# Patient Record
Sex: FEMALE | Race: WHITE | URBAN - METROPOLITAN AREA
[De-identification: names, ages, dates, MRNs, and addresses within clinical notes are randomized per-mention and may not be internally consistent; named-entity substitution may affect disease eponyms.]

---

## 2018-05-05 ENCOUNTER — OFFICE VISIT (OUTPATIENT)
Dept: URGENT CARE | Facility: URGENT CARE | Age: 68
End: 2018-05-05
Payer: COMMERCIAL

## 2018-05-05 VITALS
SYSTOLIC BLOOD PRESSURE: 122 MMHG | DIASTOLIC BLOOD PRESSURE: 79 MMHG | HEART RATE: 80 BPM | RESPIRATION RATE: 20 BRPM | TEMPERATURE: 98.9 F | WEIGHT: 184.5 LBS

## 2018-05-05 DIAGNOSIS — S91.209A TOENAIL AVULSION, INITIAL ENCOUNTER: Primary | ICD-10-CM

## 2018-05-05 PROCEDURE — 99203 OFFICE O/P NEW LOW 30 MIN: CPT | Mod: 25 | Performed by: PHYSICIAN ASSISTANT

## 2018-05-05 PROCEDURE — 90471 IMMUNIZATION ADMIN: CPT | Performed by: PHYSICIAN ASSISTANT

## 2018-05-05 PROCEDURE — 90714 TD VACC NO PRESV 7 YRS+ IM: CPT | Performed by: PHYSICIAN ASSISTANT

## 2018-05-05 NOTE — PROGRESS NOTES
SUBJECTIVE:  Chief Complaint   Patient presents with     Toe Injury     Lt big toe injury x today about 4:30 pm.      Lawanda Langley is a 68 year old female presents with a chief complaint of left toe(s) great pain and tenderness.  The injury occurred a few hours ago.   The injury happened while at home and moving heavy concrete into a wheel barrel which fell onto left great toe. How: trauma:   The patient complained of mild pain  and has not had decreased ROM.  Pain exacerbated by walking.  Relieved by nothing.  She treated it initially with no therapy. This is the first time this type of injury has occurred to this patient.   She noticed the nail was gone and had some bleeding. Tetanus 2009.     No past medical history on file.  Current Outpatient Prescriptions   Medication Sig Dispense Refill     LEVOTHYROXINE SODIUM PO        SIMVASTATIN PO        Social History   Substance Use Topics     Smoking status: Never Smoker     Smokeless tobacco: Never Used     Alcohol use Not on file       ROS:  CONSTITUTIONAL:NEGATIVE for fever, chills, change in weight  INTEGUMENTARY/SKIN: nail problem to left great toe  MUSCULOSKELETAL: arthralgias left great toe    EXAM:   /79  Pulse 80  Temp 98.9  F (37.2  C) (Oral)  Resp 20  Wt 184 lb 8 oz (83.7 kg)  Gen: healthy,alert,no distress  Extremity: toe(s) great left has mild point tenderness nail bed and FROM. Nailbed is jaggedly gouged with minimal bleeding.   There is not compromise to the distal circulation.  Pulses are +2 and CRT is brisk  GENERAL APPEARANCE: healthy, alert and no distress  EXTREMITIES: peripheral pulses normal  SKIN: no suspicious lesions or rashes  NEURO: Normal strength and tone, sensory exam grossly normal, mentation intact and speech normal    X-RAY was not done.    ASSESSMENT:     1. Toenail avulsion, initial encounter    - TD PRESERV FREE >=7 YRS ADS IM    PLAN:  1) cleaned wound was tube gauze dressed with plan to have evaluation in 2 days in  clinic.

## 2018-05-05 NOTE — MR AVS SNAPSHOT
"              After Visit Summary   2018    Lawanda Langley    MRN: [id number]           Patient Information     Date Of Birth          1950        Visit Information        Provider Department      2018 5:25 PM Sarbjit Stinson PA-C [facility]        Today's Diagnoses     Toenail avulsion, initial encounter    -  1       Follow-ups after your visit        Who to contact     If you have questions or need follow up information about today's clinic visit or your schedule please contact [facility] directly at [phone].  Normal or non-critical lab and imaging results will be communicated to you by [facility], letter or phone within 4 business days after the clinic has received the results. If you do not hear from us within 7 days, please contact the clinic through [facility] or phone. If you have a critical or abnormal lab result, we will notify you by phone as soon as possible.  Submit refill requests through [employer] or call your pharmacy and they will forward the refill request to us. Please allow 3 business days for your refill to be completed.          Additional Information About Your Visit        MyChart Information     [employer] lets you send messages to your doctor, view your test results, renew your prescriptions, schedule appointments and more. To sign up, go to [email or web address] . Click on \"Log in\" on the left side of the screen, which will take you to the Welcome page. Then click on \"Sign up Now\" on the right side of the page.     You will be asked to enter the access code listed below, as well as some personal information. Please follow the directions to create your username and password.     Your access code is: [email or web address]  Expires: 8/3/2018  8:36 PM     Your access code will  in 90 days. If you need help or a new code, please call your [place] clinic or [phone].        Care EveryWhere ID     This is your Care EveryWhere ID. " This could be used by other organizations to access your [facility] medical records  [id number]        Your Vitals Were     Pulse Temperature Respirations             80 98.9  F (37.2  C) (Oral) 20          Blood Pressure from Last 3 Encounters:   05/05/18 122/79    Weight from Last 3 Encounters:   05/05/18 184 lb 8 oz (83.7 kg)              We Performed the Following     TD PRESERV FREE >=7 YRS ADS IM        Primary Care Provider Fax #    Physician No Ref-Primary [phone]       No address on file        Equal Access to Services     STELLA [facility] JANE : Hadii aad ku hadasho Soomaali, waaxda luqadaha, qaybta kaalmada adeegyada, waxay idiin hayaan adeeg khkiki burrows . So [facility] [phone].    ATENCIÓN: Si habla español, tiene a donato disposición servicios gratuitos de asistencia lingüística. Llame al [phone].    We comply with applicable federal civil rights laws and [place] laws. We do not discriminate on the basis of race, color, national origin, age, disability, sex, sexual orientation, or gender identity.            Thank you!     Thank you for choosing [facility]  for your care. Our goal is always to provide you with excellent care. Hearing back from our patients is one way we can continue to improve our services. Please take a few minutes to complete the written survey that you may receive in the mail after your visit with us. Thank you!             Your Updated Medication List - Protect others around you: Learn how to safely use, store and throw away your medicines at www.disposemymeds.org.          This list is accurate as of 5/5/18  8:36 PM.  Always use your most recent med list.                   Brand Name Dispense Instructions for use Diagnosis    LEVOTHYROXINE SODIUM PO           SIMVASTATIN PO

## 2018-05-06 NOTE — NURSING NOTE
Screening Questionnaire for Adult Immunization    Are you sick today?   No   Do you have allergies to medications, food, a vaccine component or latex?  Yes   Have you ever had a serious reaction after receiving a vaccination?   No   Do you have a long-term health problem with heart disease, lung disease, asthma, kidney disease, metabolic disease (e.g. diabetes), anemia, or other blood disorder?   No   Do you have cancer, leukemia, HIV/AIDS, or any other immune system problem?   No   In the past 3 months, have you taken medications that affect  your immune system, such as prednisone, other steroids, or anticancer drugs; drugs for the treatment of rheumatoid arthritis, Crohn s disease, or psoriasis; or have you had radiation treatments?   No   Have you had a seizure, or a brain or other nervous system problem?   No   During the past year, have you received a transfusion of blood or blood     products, or been given immune (gamma) globulin or antiviral drug?   No   For women: Are you pregnant or is there a chance you could become        pregnant during the next month?   No   Have you received any vaccinations in the past 4 weeks?   No     Immunization questionnaire was positive for at least one answer.  Notified Dr. Stinson.        Per orders of Dr. Stinson, injection of Td given by Hiral Cordoba. Patient instructed to remain in clinic for 15 minutes afterwards, and to report any adverse reaction to me immediately.       Screening performed by Hiral Cordoba on 5/5/2018 at 6:27 PM.        Prior to injection verified patient identity using patient's name and date of birth.  Due to injection administration, patient instructed to remain in clinic for 15 minutes  afterwards, and to report any adverse reaction to me immediately.